# Patient Record
Sex: FEMALE | NOT HISPANIC OR LATINO | URBAN - NONMETROPOLITAN AREA
[De-identification: names, ages, dates, MRNs, and addresses within clinical notes are randomized per-mention and may not be internally consistent; named-entity substitution may affect disease eponyms.]

---

## 2021-01-11 ENCOUNTER — OFFICE VISIT (OUTPATIENT)
Dept: URBAN - NONMETROPOLITAN AREA CLINIC 2 | Facility: CLINIC | Age: 26
End: 2021-01-11

## 2021-01-11 NOTE — HPI-TODAY'S VISIT:
25 year old with history of ADD on Adderral, schizophrenia, depression, HPV positive, presenting for epigastric abdominal pain.  Historyo f hernia repair, 2 C-sections, cholecystectomy  Plan: -	CBC, chemistry panel, LFTs, CRP, Celiac serologies, TSH, EGD.

## 2021-03-23 ENCOUNTER — OFFICE VISIT (OUTPATIENT)
Dept: URBAN - NONMETROPOLITAN AREA CLINIC 2 | Facility: CLINIC | Age: 26
End: 2021-03-23
Payer: MEDICAID

## 2021-03-23 ENCOUNTER — LAB OUTSIDE AN ENCOUNTER (OUTPATIENT)
Dept: URBAN - NONMETROPOLITAN AREA CLINIC 2 | Facility: CLINIC | Age: 26
End: 2021-03-23

## 2021-03-23 DIAGNOSIS — K21.9 GERD WITHOUT ESOPHAGITIS: ICD-10-CM

## 2021-03-23 DIAGNOSIS — K59.09 CHRONIC CONSTIPATION: ICD-10-CM

## 2021-03-23 PROCEDURE — 99204 OFFICE O/P NEW MOD 45 MIN: CPT | Performed by: NURSE PRACTITIONER

## 2021-03-23 RX ORDER — DEXTROAMPHETAMINE SACCHARATE, AMPHETAMINE ASPARTATE, DEXTROAMPHETAMINE SULFATE AND AMPHETAMINE SULFATE 5; 5; 5; 5 MG/1; MG/1; MG/1; MG/1
TABLET ORAL
Qty: 60 UNSPECIFIED | Status: ACTIVE | COMMUNITY

## 2021-03-23 RX ORDER — AMOXICILLIN AND CLAVULANATE POTASSIUM 875; 125 MG/1; MG/1
TABLET, FILM COATED ORAL
Qty: 14 UNSPECIFIED | Status: ACTIVE | COMMUNITY

## 2021-03-23 RX ORDER — PANTOPRAZOLE SODIUM 40 MG/1
1 TABLET TABLET, DELAYED RELEASE ORAL ONCE A DAY
Qty: 90 TABLET | Refills: 3 | OUTPATIENT
Start: 2021-03-23

## 2021-03-23 RX ORDER — IBUPROFEN 800 MG/1
PRN TABLET ORAL THREE TIMES A DAY
Status: ACTIVE | COMMUNITY

## 2021-03-23 NOTE — HPI-TODAY'S VISIT:
3/23/2021 Ms. Sujata Whitman is a 26-year-old female with history of ADD on Adderall, status post cholecystectomy, who was referred by Dr. Dharmesh Arredondo for GERD.  A copy of this document will be forwarded to the referring provider. She reports heartburn that began about 3 years ago and is progressively worsening.  Acid will come up into the back of her throat.  She will have cough occasionally.  She has nocturnal symptoms.  She has tried over-the-counter lansoprazole and omeprazole with no benefit although it sounds like she took this short-term.  Denies dysphagia.  No nausea vomiting.  Appetite and weight is stable.  Reports her symptoms did worsen when she started a new job and is eating 60 to 90 minutes daily.  She has cut back on this. She also reports chronic constipation ongoing for most of her life.  She has not tried over-the-counter laxatives.  She does have associated lower abdominal pain and back pain if she goes more than 2 to 3 days without a bowel movement.  No alternating diarrhea.  She thinks her symptoms may be worse with lactose.  TG

## 2021-03-30 ENCOUNTER — OFFICE VISIT (OUTPATIENT)
Dept: URBAN - NONMETROPOLITAN AREA SURGERY CENTER 1 | Facility: SURGERY CENTER | Age: 26
End: 2021-03-30

## 2021-04-06 ENCOUNTER — OFFICE VISIT (OUTPATIENT)
Dept: URBAN - NONMETROPOLITAN AREA SURGERY CENTER 1 | Facility: SURGERY CENTER | Age: 26
End: 2021-04-06
Payer: MEDICAID

## 2021-04-06 DIAGNOSIS — K31.89 ACQUIRED DEFORMITY OF DUODENUM: ICD-10-CM

## 2021-04-06 PROCEDURE — G8907 PT DOC NO EVENTS ON DISCHARG: HCPCS | Performed by: INTERNAL MEDICINE

## 2021-04-06 PROCEDURE — 43239 EGD BIOPSY SINGLE/MULTIPLE: CPT | Performed by: INTERNAL MEDICINE

## 2021-04-20 ENCOUNTER — OFFICE VISIT (OUTPATIENT)
Dept: URBAN - NONMETROPOLITAN AREA CLINIC 2 | Facility: CLINIC | Age: 26
End: 2021-04-20
Payer: MEDICAID

## 2021-04-20 DIAGNOSIS — K59.09 CHRONIC CONSTIPATION: ICD-10-CM

## 2021-04-20 DIAGNOSIS — K21.9 GERD WITHOUT ESOPHAGITIS: ICD-10-CM

## 2021-04-20 DIAGNOSIS — R10.13 DYSPEPSIA: ICD-10-CM

## 2021-04-20 PROCEDURE — 99213 OFFICE O/P EST LOW 20 MIN: CPT | Performed by: NURSE PRACTITIONER

## 2021-04-20 RX ORDER — LINACLOTIDE 145 UG/1
1 CAPSULE AT LEAST 30 MINUTES BEFORE THE FIRST MEAL OF THE DAY ON AN EMPTY STOMACH CAPSULE, GELATIN COATED ORAL ONCE A DAY
Qty: 90 CAPSULES | Refills: 3 | OUTPATIENT
Start: 2021-04-20 | End: 2022-04-15

## 2021-04-20 RX ORDER — PANTOPRAZOLE SODIUM 40 MG/1
1 TABLET TABLET, DELAYED RELEASE ORAL ONCE A DAY
Qty: 90 TABLET | Refills: 3 | Status: ACTIVE | COMMUNITY
Start: 2021-03-23

## 2021-04-20 RX ORDER — DEXTROAMPHETAMINE SACCHARATE, AMPHETAMINE ASPARTATE, DEXTROAMPHETAMINE SULFATE AND AMPHETAMINE SULFATE 5; 5; 5; 5 MG/1; MG/1; MG/1; MG/1
TABLET ORAL
Qty: 60 UNSPECIFIED | Status: ACTIVE | COMMUNITY

## 2021-04-20 RX ORDER — IBUPROFEN 800 MG/1
PRN TABLET ORAL THREE TIMES A DAY
Status: ACTIVE | COMMUNITY

## 2021-04-20 RX ORDER — PANTOPRAZOLE SODIUM 40 MG/1
1 TABLET TABLET, DELAYED RELEASE ORAL ONCE A DAY
OUTPATIENT
Start: 2021-03-23

## 2021-04-20 RX ORDER — AMITRIPTYLINE HYDROCHLORIDE 10 MG/1
1 TABLET AT BEDTIME TABLET, FILM COATED ORAL ONCE A DAY
Qty: 90 TABLET | Refills: 1 | OUTPATIENT
Start: 2021-04-20

## 2021-04-20 RX ORDER — AMOXICILLIN AND CLAVULANATE POTASSIUM 875; 125 MG/1; MG/1
TABLET, FILM COATED ORAL
Qty: 14 UNSPECIFIED | Status: ACTIVE | COMMUNITY

## 2021-04-20 NOTE — HPI-TODAY'S VISIT:
3/23/2021 Ms. Sujata Whitman is a 26-year-old female with history of ADD on Adderall, status post cholecystectomy, who was referred by Dr. Dharmesh Arredondo for GERD.  A copy of this document will be forwarded to the referring provider. She reports heartburn that began about 3 years ago and is progressively worsening.  Acid will come up into the back of her throat.  She will have cough occasionally.  She has nocturnal symptoms.  She has tried over-the-counter lansoprazole and omeprazole with no benefit although it sounds like she took this short-term.  Denies dysphagia.  No nausea vomiting.  Appetite and weight is stable.  Reports her symptoms did worsen when she started a new job and is eating 60 to 90 minutes daily.  She has cut back on this. She also reports chronic constipation ongoing for most of her life.  She has not tried over-the-counter laxatives.  She does have associated lower abdominal pain and back pain if she goes more than 2 to 3 days without a bowel movement.  No alternating diarrhea.  She thinks her symptoms may be worse with lactose.  TG 4/20/2021 Patient presents for follow-up after EGD that showed normal esophagus, gastric mucosa, and duodenum.  Esophageal and gastric biopsies were normal and negative for Helicobacter pylori.  Her heartburn symptoms are much better since starting pantoprazole 40 mg once daily.  She does continue with some intermittent heartburn. Constipation and lower abdominal pain are improved on MiraLAX and increase water intake as well as cutting out lactose however she continued with some symptoms and often the MiraLAX is difficult to take due to her schedule.  She would like to try a pill for her constipation.  Discussed that Medicaid likely will not cover this. She is under significant stress related to long work hours and is currently not living in her home.  She has a TPO out for her boyfriend and is hopeful he will be removed from the home soon so that she can return.  When she is stressed she has more GI symptoms.  Discussed IBS and functional bowel disease today.  TG

## 2021-07-20 ENCOUNTER — OFFICE VISIT (OUTPATIENT)
Dept: URBAN - NONMETROPOLITAN AREA CLINIC 2 | Facility: CLINIC | Age: 26
End: 2021-07-20

## 2021-07-20 ENCOUNTER — OFFICE VISIT (OUTPATIENT)
Dept: URBAN - NONMETROPOLITAN AREA CLINIC 2 | Facility: CLINIC | Age: 26
End: 2021-07-20
Payer: MEDICAID

## 2021-07-20 ENCOUNTER — DASHBOARD ENCOUNTERS (OUTPATIENT)
Age: 26
End: 2021-07-20

## 2021-07-20 ENCOUNTER — WEB ENCOUNTER (OUTPATIENT)
Dept: URBAN - NONMETROPOLITAN AREA CLINIC 2 | Facility: CLINIC | Age: 26
End: 2021-07-20

## 2021-07-20 DIAGNOSIS — R10.13 DYSPEPSIA: ICD-10-CM

## 2021-07-20 DIAGNOSIS — K21.9 GERD WITHOUT ESOPHAGITIS: ICD-10-CM

## 2021-07-20 DIAGNOSIS — K59.09 CHRONIC CONSTIPATION: ICD-10-CM

## 2021-07-20 PROBLEM — 266435005: Status: ACTIVE | Noted: 2021-03-23

## 2021-07-20 PROCEDURE — 99213 OFFICE O/P EST LOW 20 MIN: CPT | Performed by: NURSE PRACTITIONER

## 2021-07-20 RX ORDER — PANTOPRAZOLE SODIUM 40 MG/1
1 TABLET TABLET, DELAYED RELEASE ORAL ONCE A DAY
Status: ACTIVE | COMMUNITY
Start: 2021-03-23

## 2021-07-20 RX ORDER — AMOXICILLIN AND CLAVULANATE POTASSIUM 875; 125 MG/1; MG/1
TABLET, FILM COATED ORAL
Qty: 14 UNSPECIFIED | Status: DISCONTINUED | COMMUNITY

## 2021-07-20 RX ORDER — AMITRIPTYLINE HYDROCHLORIDE 10 MG/1
1 TABLET AT BEDTIME TABLET, FILM COATED ORAL ONCE A DAY
Qty: 90 TABLET | Refills: 1 | Status: ACTIVE | COMMUNITY
Start: 2021-04-20

## 2021-07-20 RX ORDER — IBUPROFEN 800 MG/1
PRN TABLET ORAL THREE TIMES A DAY
Status: ACTIVE | COMMUNITY

## 2021-07-20 RX ORDER — IBUPROFEN 800 MG/1
PRN TABLET ORAL THREE TIMES A DAY
Status: DISCONTINUED | COMMUNITY

## 2021-07-20 RX ORDER — AMOXICILLIN AND CLAVULANATE POTASSIUM 875; 125 MG/1; MG/1
TABLET, FILM COATED ORAL
Qty: 14 UNSPECIFIED | Status: ACTIVE | COMMUNITY

## 2021-07-20 RX ORDER — PANTOPRAZOLE SODIUM 40 MG/1
1 TABLET TABLET, DELAYED RELEASE ORAL ONCE A DAY
Status: DISCONTINUED | COMMUNITY
Start: 2021-03-23

## 2021-07-20 RX ORDER — LINACLOTIDE 145 UG/1
1 CAPSULE AT LEAST 30 MINUTES BEFORE THE FIRST MEAL OF THE DAY ON AN EMPTY STOMACH CAPSULE, GELATIN COATED ORAL ONCE A DAY
Qty: 90 CAPSULES | Refills: 3 | Status: DISCONTINUED | COMMUNITY
Start: 2021-04-20 | End: 2022-04-15

## 2021-07-20 RX ORDER — DEXTROAMPHETAMINE SACCHARATE, AMPHETAMINE ASPARTATE, DEXTROAMPHETAMINE SULFATE AND AMPHETAMINE SULFATE 5; 5; 5; 5 MG/1; MG/1; MG/1; MG/1
TABLET ORAL
Qty: 60 UNSPECIFIED | Status: ACTIVE | COMMUNITY

## 2021-07-20 RX ORDER — AMITRIPTYLINE HYDROCHLORIDE 10 MG/1
1 TABLET AT BEDTIME TABLET, FILM COATED ORAL ONCE A DAY
Qty: 90 TABLET | Refills: 1 | Status: DISCONTINUED | COMMUNITY
Start: 2021-04-20

## 2021-07-20 RX ORDER — LINACLOTIDE 145 UG/1
1 CAPSULE AT LEAST 30 MINUTES BEFORE THE FIRST MEAL OF THE DAY ON AN EMPTY STOMACH CAPSULE, GELATIN COATED ORAL ONCE A DAY
Qty: 90 CAPSULES | Refills: 3 | Status: ACTIVE | COMMUNITY
Start: 2021-04-20 | End: 2022-04-15

## 2021-07-20 NOTE — HPI-TODAY'S VISIT:
3/23/2021 Ms. Sujata Whitman is a 26-year-old female with history of ADD on Adderall, status post cholecystectomy, who was referred by Dr. Dharmesh Arredondo for GERD.  A copy of this document will be forwarded to the referring provider. She reports heartburn that began about 3 years ago and is progressively worsening.  Acid will come up into the back of her throat.  She will have cough occasionally.  She has nocturnal symptoms.  She has tried over-the-counter lansoprazole and omeprazole with no benefit although it sounds like she took this short-term.  Denies dysphagia.  No nausea vomiting.  Appetite and weight is stable.  Reports her symptoms did worsen when she started a new job and is eating 60 to 90 minutes daily.  She has cut back on this. She also reports chronic constipation ongoing for most of her life.  She has not tried over-the-counter laxatives.  She does have associated lower abdominal pain and back pain if she goes more than 2 to 3 days without a bowel movement.  No alternating diarrhea.  She thinks her symptoms may be worse with lactose.  TG  4/20/2021 Patient presents for follow-up after EGD that showed normal esophagus, gastric mucosa, and duodenum.  Esophageal and gastric biopsies were normal and negative for Helicobacter pylori.  Her heartburn symptoms are much better since starting pantoprazole 40 mg once daily.  She does continue with some intermittent heartburn. Constipation and lower abdominal pain are improved on MiraLAX and increase water intake as well as cutting out lactose however she continued with some symptoms and often the MiraLAX is difficult to take due to her schedule.  She would like to try a pill for her constipation.  Discussed that Medicaid likely will not cover this. She is under significant stress related to long work hours and is currently not living in her home.  She has a TPO out for her boyfriend and is hopeful he will be removed from the home soon so that she can return.  When she is stressed she has more GI symptoms.  Discussed IBS and functional bowel disease today.  TG  7/20/21 Patient presents for follow up for GERD and constipation. Pantoprazole stopped working and she started FD Guard and is doing well with this except for occasional excessive belching. No nausea/vomiting. Tolerate diet well. Appetite and weight are stable.  She did not start the Linzess for her constipation. She is doing well with dietary change including increased fiber and taking daily benefiber and miralax with appropriate water intake. She did not start the AMT. She continues on Align once daily. Overall, her symptoms are much better. Stress is also much better after ending a difficult relationship. She does not want to try any new medications today. TG